# Patient Record
(demographics unavailable — no encounter records)

---

## 2025-04-21 NOTE — CARDIOLOGY SUMMARY
[Today's Date] : [unfilled] [FreeTextEntry1] : 15 lead EKG was performed which demonstrated sinus rhythm at a rate of 98 bpm.  All axes and intervals were within normal limits for age. There was no evidence of ventricular hypertrophy and there were no significant ST segment changes. There was an incomplete right bundle branch block pattern. [FreeTextEntry2] : 2-dimensional echo with Doppler demonstrated a structurally normal heart with a normal, tricommissural aortic valve, normal aortic dimensions and no intracardiac shunting. There was normal biventricular function and no pericardial effusion.

## 2025-04-21 NOTE — CONSULT LETTER
[Today's Date] : [unfilled] [Name] : Name: [unfilled] [] : : ~~ [Today's Date:] : [unfilled] [Dear  ___:] : Dear Dr. [unfilled]: [Consult] : I had the pleasure of evaluating your patient, [unfilled]. My full evaluation follows. [Consult - Single Provider] : Thank you very much for allowing me to participate in the care of this patient. If you have any questions, please do not hesitate to contact me. [Sincerely,] : Sincerely, [FreeTextEntry4] : Hayder Navarro MD [FreeTextEntry5] :  [address], [place] [address] [FreeTextEntry6] : [phone] [de-identified] : Amarilis Farrell MD, FAAP, FACC  Attending Physician, Division of Pediatric Cardiology  [facility]   , Department of Pediatrics  [facility] [employer] [facility]

## 2025-04-21 NOTE — PHYSICAL EXAM
[General Appearance - Alert] : alert [General Appearance - In No Acute Distress] : in no acute distress [General Appearance - Well Nourished] : well nourished [General Appearance - Well Developed] : well developed [General Appearance - Well-Appearing] : well appearing [Appearance Of Head] : the head was normocephalic [Facies] : there were no dysmorphic facial features [Examination Of The Oral Cavity] : mucous membranes were moist and pink [Auscultation Breath Sounds / Voice Sounds] : breath sounds clear to auscultation bilaterally [Normal Chest Appearance] : the chest was normal in appearance [Chest Visual Inspection Thoracic Deformity] : no chest wall deformity [Apical Impulse] : quiet precordium with normal apical impulse [Heart Rate And Rhythm] : normal heart rate and rhythm [Heart Sounds] : normal S1 and S2 [No Murmur] : no murmurs  [Heart Sounds Gallop] : no gallops [Heart Sounds Pericardial Friction Rub] : no pericardial rub [Edema] : no edema [Arterial Pulses] : normal upper and lower extremity pulses with no pulse delay [Heart Sounds Click] : no clicks [Capillary Refill Test] : normal capillary refill [Bowel Sounds] : normal bowel sounds [Abdomen Soft] : soft [Nondistended] : nondistended [Abdomen Tenderness] : non-tender [] : no hepato-splenomegaly [Nail Clubbing] : no clubbing  or cyanosis of the fingernails [Motor Tone] : normal muscle strength and tone [Skin Lesions] : no lesions [Skin Turgor] : normal turgor [Demonstrated Behavior - Infant Nonreactive To Parents] : interactive [Mood] : mood and affect were appropriate for age [Demonstrated Behavior] : normal behavior

## 2025-06-21 NOTE — PHYSICAL EXAM
[Alert] : alert [No Acute Distress] : no acute distress [Playful] : playful [Normocephalic] : normocephalic [Conjunctivae with no discharge] : conjunctivae with no discharge [PERRL] : PERRL [EOMI Bilateral] : EOMI bilateral [Auricles Well Formed] : auricles well formed [Clear Tympanic membranes with present light reflex and bony landmarks] : clear tympanic membranes with present light reflex and bony landmarks [No Discharge] : no discharge [Nares Patent] : nares patent [Pink Nasal Mucosa] : pink nasal mucosa [Palate Intact] : palate intact [Uvula Midline] : uvula midline [Nonerythematous Oropharynx] : nonerythematous oropharynx [No Caries] : no caries [Trachea Midline] : trachea midline [Supple, full passive range of motion] : supple, full passive range of motion [No Palpable Masses] : no palpable masses [Symmetric Chest Rise] : symmetric chest rise [Clear to Auscultation Bilaterally] : clear to auscultation bilaterally [Normoactive Precordium] : normoactive precordium [Regular Rate and Rhythm] : regular rate and rhythm [Normal S1, S2 present] : normal S1, S2 present [No Murmurs] : no murmurs [+2 Femoral Pulses] : +2 femoral pulses [Soft] : soft [NonTender] : non tender [Non Distended] : non distended [Normoactive Bowel Sounds] : normoactive bowel sounds [No Hepatomegaly] : no hepatomegaly [No Splenomegaly] : no splenomegaly [Allan 1] : Allan 1 [Central Urethral Opening] : central urethral opening [Testicles Descended Bilaterally] : testicles descended bilaterally [Patent] : patent [Normally Placed] : normally placed [No Abnormal Lymph Nodes Palpated] : no abnormal lymph nodes palpated [Symmetric Buttocks Creases] : symmetric buttocks creases [Symmetric Hip Rotation] : symmetric hip rotation [No Gait Asymmetry] : no gait asymmetry [No pain or deformities with palpation of bone, muscles, joints] : no pain or deformities with palpation of bone, muscles, joints [Normal Muscle Tone] : normal muscle tone [No Spinal Dimple] : no spinal dimple [NoTuft of Hair] : no tuft of hair [Straight] : straight [+2 Patella DTR] : +2 patella DTR [Cranial Nerves Grossly Intact] : cranial nerves grossly intact [No Rash or Lesions] : no rash or lesions [de-identified] : mild R metatrsus adductus, R achilles tendon without contracture

## 2025-06-21 NOTE — HISTORY OF PRESENT ILLNESS
[Mother] : mother [Normal] : Normal [Brushing teeth] : Brushing teeth [Yes] : Patient goes to dentist yearly [Playtime (60 min/d)] : Playtime 60 min a day [Appropiate parent-child-sibling interaction] : Appropriate parent-child-sibling interaction [Child Cooperates] : Child cooperates [Parent has appropriate responses to behavior] : Parent has appropriate responses to behavior [In Pre-K] : In Pre-K [Adequate performance] : Adequate performance [Adequate attention] : Adequate attention [No difficulties with Homework] : No difficulties with homework  [No] : Not at  exposure [Water heater temperature set at <120 degrees F] : Water heater temperature set at <120 degrees F [Car seat in back seat] : Car seat in back seat [Carbon Monoxide Detectors] : Carbon monoxide detectors [Smoke Detectors] : Smoke detectors [Supervised outdoor play] : Supervised outdoor play [Exposure to electronic nicotine delivery system] : No exposure to electronic nicotine delivery system [Up to date] : Up to date [FreeTextEntry7] :  No updates from last check up. No hospitalizations, no ED visits. Had seasonal allergies this spring, better now.  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

## 2025-06-21 NOTE — DISCUSSION/SUMMARY
[Normal Growth] : growth [Normal Development] : development  [No Elimination Concerns] : elimination [Continue Regimen] : feeding [No Skin Concerns] : skin [Normal Sleep Pattern] : sleep [None] : no medical problems [School Readiness] : school readiness [Mental Health] : mental health [Nutrition and Physical Activity] : nutrition and physical activity [Oral Health] : oral health [Safety] : safety [Anticipatory Guidance Given] : Anticipatory guidance addressed as per the history of present illness section [Parent/Guardian] : Parent/Guardian [FreeTextEntry6] : UTD [FreeTextEntry7] : just otc allergy meds prn  [FreeTextEntry1] : 4 y/o male here for wcc.   Growing and developing normally. nl PE passed vision, hearing and go check  nl BP  Continue balanced diet with all food groups. Brush teeth twice a day with toothbrush. Recommend visit to dentist. As per car seat 's guidelines, use forward-facing booster seat until child reaches highest weight/height for seat. Child needs to ride in a belt-positioning booster seat until  4 feet 9 inches has been reached and are between 8 and 12 years of age. Put child to sleep in own bed. Help child to maintain consistent daily routines and sleep schedule.  discussed. Ensure home is safe. Teach child about personal safety. Use consistent, positive discipline. Read aloud to child. Limit screen time to no more than 2 hours per day.  rtc for flu shot in fall, annual wcc, prn

## 2025-06-21 NOTE — DEVELOPMENTAL MILESTONES
[Normal Development] : Normal Development [None] : none [Dresses and undresses without help] : dresses and undresses without help [Goes to the bathroom independently] : goes to bathroom independently [Is dry through the day] :  is dry through the day [Plays and interacts with peer] : plays and interacts with peer [Answers "why" questions] : answers "why" questions [Tells a story of 2 sentences or more] : tells a story of 2 sentences or more [Follows directions for 4 individual] : follows directions for 4 individual prepositions [Counts 5 objects] : counts 5 objects [Names 3 or more numbers] : names 3 or more numbers [Names 4 or more letters out of order] : names 4 or more letters out of order [Walks on tiptoes when asked] : walks on tiptoes when asked [Catches a bounced ball with] : catches a bounced ball with 2 hands [Copies a triangle] : copies a triangle [Copies first name] : copies first name [Cuts well with scissors] : cuts well with scissors [Writes 2 or more letters] : writes 2 or more letters